# Patient Record
Sex: MALE | Race: WHITE | Employment: PART TIME
[De-identification: names, ages, dates, MRNs, and addresses within clinical notes are randomized per-mention and may not be internally consistent; named-entity substitution may affect disease eponyms.]

---

## 2024-01-23 ENCOUNTER — TRANSCRIBE ORDERS (OUTPATIENT)
Dept: OTHER | Age: 44
End: 2024-01-23

## 2024-01-23 DIAGNOSIS — G71.02 FACIOSCAPULOHUMERAL MUSCULAR DYSTROPHY (H): Primary | ICD-10-CM

## 2024-01-29 ENCOUNTER — TELEPHONE (OUTPATIENT)
Dept: NEUROLOGY | Facility: CLINIC | Age: 44
End: 2024-01-29
Payer: COMMERCIAL

## 2024-01-29 NOTE — TELEPHONE ENCOUNTER
Patient Contacted to schedule the following:    Appointment type: New Muscular Dystrophy  Provider: Cristel Foster Karachunski, [place]  Return date: First available  Specialty phone number: [phone]  Additional appointment(s) needed: N/A  Additional Notes: N/A    Spoke with patient, scheduled on 2/21 at 8:30am with Dr. Fam at [place].     Randell Sneed on 1/29/2024 at 3:42 PM

## 2024-02-20 NOTE — PROGRESS NOTES
CHIEF COMPLAINT / REASON FOR VISIT  FSHD    Referred by Dr. Herron    HISTORY OF PRESENT ILLNESS   is a 43 year old male presenting to neuromuscular clinic for evaluation of FSHD.  He was diagnosed and has been following up at [facility] in [place].  He is here today to establish care closer to home.    In brief summary, Mr. Miranda presented with facial weakness (inability to hold straw in place) since teenage years, then asymmetric (R>L) shoulder weakness in his 30s. EMG showed myopathic units with some fibrillation potentials. Genetic testing for FSHD was performed and confirmed repeat contraction in one allele (27kb (normal >38kb)) and 4qA haplotype.    He was last seen in hereditary muscle disease clinic at [facility] in March 2021.  In the past 3 years, he feels that The right arm has gradually gotten weaker.  He still trips on the right foot often as he is right ankle is weak.  He has not noticed much change in the left arm or left leg.  About a month ago, he woke up with pain and swelling in the bottom of the right foot (mainly in the heel).  The pain is worse when he bears weight and he has been walking on his toes on the right.  He was evaluated by podiatrist and has been using cam boot in the past 2 weeks.  He is taking naproxen twice a day, which helps alleviate some of the pain.  He feels that the pain has somewhat improved but very slowly.  He denies any shortness of breath or loud snore at night.  No trouble with swallowing.  He reports occasional neck and lower back pain that is mild and has not changed.    He recently found out that his paternal male cousin and the cousin's daughter were also diagnosed with FSHD.     Prior investigations:  - MRI right ankle 1/26/2024: Findings compatible with plantar fasciitis. There is subjacent thickening of the plantar fascia which   measures up to 8 mm in thickness with mild intrasubstance increased signal noted in the plantar fascia  posteriorly. No evidence of significant plantar fascial detachment from the calcaneus. Mild increased fluid content within the bursa posterior to the calcaneus and anterior to the distal Achilles tendon consistent with retrocalcaneal bursitis. The Achilles tendon appears intact. No evidence of an acute fracture, subluxation or dislocation. Joint spaces are preserved. Small/moderate right ankle joint effusion. Nonspecific subcutaneous edema about the right ankle.   - X-ray of cervical and lumbar spine from 2021 showed minimal cervical and lumbar degenerative changes.  - EKG in 2021 showed normal sinus rhythm.   - Overnight oximetry was indeterminate with evidence of possible sleep-related disordered breathing events. Of note, patient has been diagnosed with mild obstructive apnea before but has not been using CPAP.      REVIEW OF SYSTEMS  All negative except for what indicated in the HPI. The following portions of the patient's history were reviewed and updated as appropriate: allergies, current medications, family history, medical history, surgical history, social history, and problem list.     PAST MEDICAL/SURGICAL HISTORY   Psoriasis of skin. Denies joint pain.     FAMILY HISTORY  Father passed away from carbon monoxide poisoning. Mother is still alive and asymptomatic.  Two half brothers, 1 brother,  and one sister are asymptomatic.   Five children (4 daughters, 1 son, age 4 to 15 years) are asymptomatic.     MEDICATIONS    Current Outpatient Medications:     EPINEPHrine (EPIPEN 2-SANTOS) 0.3 MG/0.3ML injection 2-pack, Inject 0.3 mg into the muscle, Disp: , Rfl:     escitalopram (LEXAPRO) 20 MG tablet, Take 20 mg by mouth daily, Disp: , Rfl:     lisdexamfetamine (VYVANSE) 30 MG capsule, Take 30 mg by mouth every morning, Disp: , Rfl:     naproxen (NAPROSYN) 500 MG tablet, Take 500 mg by mouth daily, Disp: , Rfl:     ALLERGIES:  Allergies   Allergen Reactions    Bees Shortness Of Breath     Itching, scratchy throat     Cats Other (See Comments)     Bee stings         PHYSICAL EXAM  /86   Pulse 71   SpO2 97%     NEUROLOGICAL EXAMINATION  Mental status: normal.  Speech: normal.  Coordination: normal rapid alternating movements and finger to nose testing  Gait: antalgic gait as he cannot bear weight on the right heel  Posture: normal.  Romberg: negative.    The Neuropathy Impairment Scoring System (NIS) strength scale was utilized.    0=normal; -1=25% weak; -2=50% weak; -3=75% weak; -3.25=movement against gravity;   -3.5=movement, gravity eliminated;-3.75=minimal contraction; -4= paralysis.  Cranial nerves   R Muscle strength L  R  L   0 Frontalis 0   Visual acuity    0 Orbicularis oculi 0  3 mm Pupils 3 mm   -1 Lower facial muscles -1  0 Light reflex 0   0 Temporal-[place] 0  0 Ptosis 0   0 Palate-Pharynx 0  0 Extraocular muscles 0   0 Sternocleidomastoid 0       0 Trapezius 0   Hearing    0 Genioglossus 0              R Muscle, strength L  R Muscle, strength L    Neck     Trunk    0 Neck flexors 0   Abdominal muscles    0 Neck extensors 0   Paraspinals     Upper Limbs    Lower Limbs    0 Supraspinatus 0  -1 Iliopsoas 0   -1 Infraspinatus 0  0 Adductors, thigh 0   -2 Pectoralis -1  0 Gluteus medius 0   -1 Deltoid 0  0 Gluteus max 0   0 Biceps brachii 0  0 Quadriceps 0   0 Brachioradialis 0  0 Hamstrings 0    Supinator/pronator   -1 Tibialis anterior 0   -1 Triceps 0  0 Peronei 0   0 Wrist extensor 0  -2 EHL 0   0 Wrist flexors 0  0 Toe extensors 0   0 Digit extensors 0  0 Tibialis post. 0   0 Digit flexors 0  0 Toe flexors 0   0 Thenar 0  0 Calf muscles 0   0 Hypothenar 0       0 Interossei 0       Muscle atrophy / enlargement: right shoulder girdle atrophy with reverse axillary fold       R Reflexes L   -1 Biceps brachii -1   0 Brachioradialis 0   -1 Triceps -1   no Urena no   0 Quadriceps 0   * Gastroc-soleus 0   no Clonus (ankle) no   flexion Plantar response flexion   *deferred due to right ankle pain     High  arched palate: Absent. Elongated face: Absent. Scapular winging: mild lateral winging bilaterally (right worse than left) Pes cavus: Absent. Hammertoes: Absent. Contractures: Absent. Joint hypermobility: Absent.    Sensation  Face: normal.  Upper limbs: normal for all modalities.  Lower limbs: normal for all modalities.    ASSESSMENT / PLAN   #1 Fscioscapulohumeral muscular dystrophy (FSHD) type 1  #2 Right plantar fasciitis with right ankle effusion    Mr. Miranda's exam today continues to show asymmetric weakness in right>left shoulder girdle and mild weakness in right iliopsoas, TA, and EHL. These findings were relatively stable when compared to his exam from 2021. I suspect the right foot plantar fasciitis and right ankle effusion is related to the right ankle dorsiflexor weakness resulting in abnormal ankle and foot placement while walking. For this acute phase, I will refer him to orthopedic ankle/foot specialist to see if local injection will provide any benefit. He would also benefit from a referral to PT and OT once his ankle/foot pain is better controlled.     Recommendations:  - Consult orthopedic ankle/foot  - PT. He may benefit from right AFO. Will ask PT input on this.   - OT for shoulder girdle weakness  - He would like to see dermatology for further management of psoriasis  - Follow-up in 1 year    I spent a total of 60 minutes on the day of the visit for chart review, face-to-face visit, counseling/coordination of care, and documentation. Please see the note for further information on patient assessment and treatment.       PATIENT EDUCATION  Ready to learn, no apparent learning barriers were identified; learning preferences include listening.  Explained diagnosis and treatment plan; patient expressed understanding of the content.

## 2024-02-21 ENCOUNTER — OFFICE VISIT (OUTPATIENT)
Dept: NEUROLOGY | Facility: CLINIC | Age: 44
End: 2024-02-21
Attending: FAMILY MEDICINE
Payer: COMMERCIAL

## 2024-02-21 VITALS — DIASTOLIC BLOOD PRESSURE: 86 MMHG | SYSTOLIC BLOOD PRESSURE: 129 MMHG | HEART RATE: 71 BPM | OXYGEN SATURATION: 97 %

## 2024-02-21 DIAGNOSIS — L40.9 PSORIASIS: ICD-10-CM

## 2024-02-21 DIAGNOSIS — G71.02 FSHD (FACIOSCAPULOHUMERAL MUSCULAR DYSTROPHY) (H): Primary | ICD-10-CM

## 2024-02-21 DIAGNOSIS — M19.071 ARTHRITIS OF RIGHT ANKLE: ICD-10-CM

## 2024-02-21 PROCEDURE — 99205 OFFICE O/P NEW HI 60 MIN: CPT | Performed by: STUDENT IN AN ORGANIZED HEALTH CARE EDUCATION/TRAINING PROGRAM

## 2024-02-21 RX ORDER — ESCITALOPRAM OXALATE 20 MG/1
20 TABLET ORAL DAILY
COMMUNITY
Start: 2024-01-17 | End: 2025-01-16

## 2024-02-21 RX ORDER — LISDEXAMFETAMINE DIMESYLATE 30 MG/1
30 CAPSULE ORAL EVERY MORNING
COMMUNITY
Start: 2024-01-17

## 2024-02-21 RX ORDER — NAPROXEN 500 MG/1
500 TABLET ORAL DAILY
COMMUNITY
Start: 2024-01-17

## 2024-02-21 RX ORDER — EPINEPHRINE 0.3 MG/.3ML
0.3 INJECTION SUBCUTANEOUS
COMMUNITY
Start: 2023-07-06

## 2024-02-21 NOTE — PATIENT INSTRUCTIONS
- PT and OT eval after your right ankle inflammation has improved. You may benefit from AFO and shoulder sling  - Referral to orthopedic for right ankle arthritis  - Referral to dermatology for management of psoriasis  - Follow-up in 1 year

## 2024-02-28 ENCOUNTER — OFFICE VISIT (OUTPATIENT)
Dept: PODIATRY | Facility: OTHER | Age: 44
End: 2024-02-28
Attending: STUDENT IN AN ORGANIZED HEALTH CARE EDUCATION/TRAINING PROGRAM
Payer: COMMERCIAL

## 2024-02-28 VITALS
BODY MASS INDEX: 34.07 KG/M2 | DIASTOLIC BLOOD PRESSURE: 80 MMHG | SYSTOLIC BLOOD PRESSURE: 128 MMHG | HEIGHT: 75 IN | WEIGHT: 274 LBS

## 2024-02-28 DIAGNOSIS — S90.31XA CONTUSION OF FOOT OR HEEL, RIGHT, INITIAL ENCOUNTER: Primary | ICD-10-CM

## 2024-02-28 DIAGNOSIS — L40.9 PSORIASIS: ICD-10-CM

## 2024-02-28 DIAGNOSIS — G71.02 FSHD (FACIOSCAPULOHUMERAL MUSCULAR DYSTROPHY) (H): ICD-10-CM

## 2024-02-28 DIAGNOSIS — M19.071 ARTHRITIS OF RIGHT ANKLE: ICD-10-CM

## 2024-02-28 DIAGNOSIS — M72.2 PLANTAR FASCIITIS: ICD-10-CM

## 2024-02-28 PROCEDURE — 99203 OFFICE O/P NEW LOW 30 MIN: CPT | Performed by: PODIATRIST

## 2024-02-28 ASSESSMENT — PAIN SCALES - GENERAL: PAINLEVEL: MODERATE PAIN (5)

## 2024-02-28 NOTE — PROGRESS NOTES
HPI:  Rolan Miranda is a 43 year old male who is seen in consultation at the request of Neurology - Chelsey Fam MD    Pt presents for eval of:   (Onset, Location, L/R, Character, Treatments, Injury if yes)    MRI Right foot 1/26/2024 -  pushed to PACS from [facility]  XR Right foot 1/23/2024 - pushed to PACS from [facility]    Facioscapulohumeral muscular dystrophy     Onset Mid Jan 2024, stepping out of bed, unable to weight bear on Right foot. No injury noted. Presents with plantar and posterior Right heel pain that radiates posterior Right lower leg and lateral Right foot/ankle, WB w/tall black fx boot. Also drop foot - Right.  Constant dull ache 2-7/10. Sharp, stabbing, shooting pain with every step and certain ROM.    Obtained boot after MRI, wearing 24/7 for first week, now only wearing for daily activity. NWB for 5 weeks from onset.    Works [employer], .      ROS: 10 point ROS neg other than the symptoms noted above in the HPI.    Patient Active Problem List   Diagnosis    Facioscapulohumeral muscular dystrophy (H)       PAST MEDICAL HISTORY: History reviewed. No pertinent past medical history.  PAST SURGICAL HISTORY: History reviewed. No pertinent surgical history.  MEDICATIONS:   Current Outpatient Medications:     escitalopram (LEXAPRO) 20 MG tablet, Take 20 mg by mouth daily, Disp: , Rfl:     lisdexamfetamine (VYVANSE) 30 MG capsule, Take 30 mg by mouth every morning, Disp: , Rfl:     naproxen (NAPROSYN) 500 MG tablet, Take 500 mg by mouth daily, Disp: , Rfl:     EPINEPHrine (EPIPEN 2-SANTOS) 0.3 MG/0.3ML injection 2-pack, Inject 0.3 mg into the muscle (Patient not taking: Reported on 2/28/2024), Disp: , Rfl:   ALLERGIES:    Allergies   Allergen Reactions    Bees Shortness Of Breath     Itching, scratchy throat    Cats Other (See Comments)     Bee stings     SOCIAL HISTORY:   Social History     Socioeconomic History    Marital status:      Spouse name: Not on file    Number of  "children: Not on file    Years of education: Not on file    Highest education level: Not on file   Occupational History    Not on file   Tobacco Use    Smoking status: Former     Types: Cigarettes    Smokeless tobacco: Current    Tobacco comments:     vaping   Vaping Use    Vaping Use: Every day    Substances: Nicotine    Devices: Disposable   Substance and Sexual Activity    Alcohol use: Not on file    Drug use: Not on file    Sexual activity: Not on file   Other Topics Concern    Not on file   Social History Narrative    Not on file     Social Determinants of Health     Financial Resource Strain: Not on file   Food Insecurity: Not on file   Transportation Needs: Not on file   Physical Activity: Not on file   Stress: Not on file   Social Connections: Not on file   Interpersonal Safety: Not on file   Housing Stability: Not on file     FAMILY HISTORY: History reviewed. No pertinent family history.    EXAM:Vitals: /80 (BP Location: Left arm, Patient Position: Sitting, Cuff Size: Adult Large)   Ht 1.905 m (6' 3\")   Wt 124.3 kg (274 lb)   BMI 34.25 kg/m    BMI= Body mass index is 34.25 kg/m .    General appearance: Patient is alert and fully cooperative with history & exam.  No sign of distress is noted during the visit.     Psychiatric: Affect is pleasant & appropriate.  Patient appears motivated to improve health.     Respiratory: Breathing is regular & unlabored while sitting.     HEENT: Hearing is intact to spoken word.  Speech is clear.  No gross evidence of visual impairment that would impact ambulation.     Vascular: DP & PT 2/4 & regular bilaterally.  No significant edema, rubor or varicosities noted.  CFT and skin temperature is normal to both lower extremities.       Neurologic: Lower extremity sensation is intact to light touch.  No evidence of weakness in the lower extremities.  No evidence of neuropathy and negative tinel sign.     Dermatologic: Skin is intact to both lower extremities without " significant lesions, rash or abrasion.  Normal texture turgor and tone. No paronychia or evidence of soft tissue infection is noted.    Musculoskeletal: Patient is ambulatory without assistive device or brace.  Discomfort is noted with firm palpation along the medial band of the plantar fascia right foot most notably at the origination upon the calcaneus not through the arch.  No pain with compression of the calcaneus medial to lateral or with palpation of the peroneal or posterior tibial tendons.  There is discomfort about the posterior calcaneus at the insertion of the Achilles tendon on the right as well.  Today no visible edema is noted but there is subtle discomfort with compression of the calcaneus medial to lateral.    Radiographs:  Osteophyte noted about the plantar calcaneus consistent with plantar fasciitis.    MRI: 1/20/2024 demonstrates reactive edema about the posterior calcaneus along the distal insertion of the Achilles tendon and plantar fascia origination no displaced fracture or disrupted cortex noted.     ASSESSMENT:       ICD-10-CM    1. Contusion of foot or heel, right, initial encounter  S90.31XA Orthotics, Mastectomy and Custom Compression Orders      2. FSHD (facioscapulohumeral muscular dystrophy) (H)  G71.02 Orthopedic  Referral     Physical Therapy  Referral     Orthotics, Mastectomy and Custom Compression Orders      3. Psoriasis  L40.9 Orthopedic  Referral     Physical Therapy  Referral     Orthotics, Mastectomy and Custom Compression Orders      4. Arthritis of right ankle  M19.071 Orthopedic  Referral     Physical Therapy  Referral     Orthotics, Mastectomy and Custom Compression Orders      5. Plantar fasciitis  M72.2           PLAN:  Reviewed patient's chart in [facility] and discussed etiology and treatment options.      Treatments:  2/28/2024 February 1, 2024 started boot  Recommend being in the boot for 6-8 weeks secondary to his  history  Interpreted previous radiograph and MRI 1/24  Patient does have some reactive edema in the Achilles tendon and plantar fascia but mostly throughout the calcaneus some stress reaction throughout the calcaneus.  Patient does kick hose clamps with the back of his heel but otherwise no known injury.  He does have a dropfoot and does not wear a brace.  Recently seen a neurologist for his muscular dystrophy and there is question about whether he would benefit from an AFO.  Therefore I placed order for PT to evaluate for AFO.  In the meantime recommend he stays in the fracture boot even during night during sleep and rest to keep the ankle at 90 and can be weightbearing as tolerated in the fracture boot.  Placed order for custom molded orthotics for long-term.  If PT feels he would benefit most from AFO we can order the AFO instead on the right.  Letter for no work and follow-up with me again in 3 weeks and we will likely progress to work if he has orthotics.  We will request that he does not kick the hose clamps with the back of his heel as his normal standard of practice is.  Follow-up after PT  Letter to stay in boot for a total of 8 weeks        Guilluame Zayas DPM

## 2024-02-28 NOTE — LETTER
2/28/2024         RE: Rolan Miranda  [address] [place] [address]        Dear Colleague,    Thank you for referring your patient, Rolan Miranda, to the [facility]. Please see a copy of my visit note below.    HPI:  Rolan Miranda is a 43 year old male who is seen in consultation at the request of Neurology - Chelsey Fam MD    Pt presents for eval of:   (Onset, Location, L/R, Character, Treatments, Injury if yes)    MRI Right foot 1/26/2024 -  pushed to PACS from [facility]  XR Right foot 1/23/2024 - pushed to PACS from [facility]    Facioscapulohumeral muscular dystrophy     Onset Mid Jan 2024, stepping out of bed, unable to weight bear on Right foot. No injury noted. Presents with plantar and posterior Right heel pain that radiates posterior Right lower leg and lateral Right foot/ankle, WB w/tall black fx boot. Also drop foot - Right.  Constant dull ache 2-7/10. Sharp, stabbing, shooting pain with every step and certain ROM.    Obtained boot after MRI, wearing 24/7 for first week, now only wearing for daily activity. NWB for 5 weeks from onset.    Works [employer], .      ROS: 10 point ROS neg other than the symptoms noted above in the HPI.    Patient Active Problem List   Diagnosis     Facioscapulohumeral muscular dystrophy (H)       PAST MEDICAL HISTORY: History reviewed. No pertinent past medical history.  PAST SURGICAL HISTORY: History reviewed. No pertinent surgical history.  MEDICATIONS:   Current Outpatient Medications:      escitalopram (LEXAPRO) 20 MG tablet, Take 20 mg by mouth daily, Disp: , Rfl:      lisdexamfetamine (VYVANSE) 30 MG capsule, Take 30 mg by mouth every morning, Disp: , Rfl:      naproxen (NAPROSYN) 500 MG tablet, Take 500 mg by mouth daily, Disp: , Rfl:      EPINEPHrine (EPIPEN 2-SANTOS) 0.3 MG/0.3ML injection 2-pack, Inject 0.3 mg into the muscle (Patient not taking: Reported on 2/28/2024), Disp: , Rfl:   ALLERGIES:    Allergies  "  Allergen Reactions     Bees Shortness Of Breath     Itching, scratchy throat     Cats Other (See Comments)     Bee stings     SOCIAL HISTORY:   Social History     Socioeconomic History     Marital status:      Spouse name: Not on file     Number of children: Not on file     Years of education: Not on file     Highest education level: Not on file   Occupational History     Not on file   Tobacco Use     Smoking status: Former     Types: Cigarettes     Smokeless tobacco: Current     Tobacco comments:     vaping   Vaping Use     Vaping Use: Every day     Substances: Nicotine     Devices: Disposable   Substance and Sexual Activity     Alcohol use: Not on file     Drug use: Not on file     Sexual activity: Not on file   Other Topics Concern     Not on file   Social History Narrative     Not on file     Social Determinants of Health     Financial Resource Strain: Not on file   Food Insecurity: Not on file   Transportation Needs: Not on file   Physical Activity: Not on file   Stress: Not on file   Social Connections: Not on file   Interpersonal Safety: Not on file   Housing Stability: Not on file     FAMILY HISTORY: History reviewed. No pertinent family history.    EXAM:Vitals: /80 (BP Location: Left arm, Patient Position: Sitting, Cuff Size: Adult Large)   Ht 1.905 m (6' 3\")   Wt 124.3 kg (274 lb)   BMI 34.25 kg/m    BMI= Body mass index is 34.25 kg/m .    General appearance: Patient is alert and fully cooperative with history & exam.  No sign of distress is noted during the visit.     Psychiatric: Affect is pleasant & appropriate.  Patient appears motivated to improve health.     Respiratory: Breathing is regular & unlabored while sitting.     HEENT: Hearing is intact to spoken word.  Speech is clear.  No gross evidence of visual impairment that would impact ambulation.     Vascular: DP & PT 2/4 & regular bilaterally.  No significant edema, rubor or varicosities noted.  CFT and skin temperature is normal " to both lower extremities.       Neurologic: Lower extremity sensation is intact to light touch.  No evidence of weakness in the lower extremities.  No evidence of neuropathy and negative tinel sign.     Dermatologic: Skin is intact to both lower extremities without significant lesions, rash or abrasion.  Normal texture turgor and tone. No paronychia or evidence of soft tissue infection is noted.    Musculoskeletal: Patient is ambulatory without assistive device or brace.  Discomfort is noted with firm palpation along the medial band of the plantar fascia right foot most notably at the origination upon the calcaneus not through the arch.  No pain with compression of the calcaneus medial to lateral or with palpation of the peroneal or posterior tibial tendons.  There is discomfort about the posterior calcaneus at the insertion of the Achilles tendon on the right as well.  Today no visible edema is noted but there is subtle discomfort with compression of the calcaneus medial to lateral.    Radiographs:  Osteophyte noted about the plantar calcaneus consistent with plantar fasciitis.    MRI: 1/20/2024 demonstrates reactive edema about the posterior calcaneus along the distal insertion of the Achilles tendon and plantar fascia origination no displaced fracture or disrupted cortex noted.     ASSESSMENT:       ICD-10-CM    1. Contusion of foot or heel, right, initial encounter  S90.31XA Orthotics, Mastectomy and Custom Compression Orders      2. FSHD (facioscapulohumeral muscular dystrophy) (H)  G71.02 Orthopedic  Referral     Physical Therapy  Referral     Orthotics, Mastectomy and Custom Compression Orders      3. Psoriasis  L40.9 Orthopedic  Referral     Physical Therapy  Referral     Orthotics, Mastectomy and Custom Compression Orders      4. Arthritis of right ankle  M19.071 Orthopedic  Referral     Physical Therapy  Referral     Orthotics, Mastectomy and Custom  Compression Orders      5. Plantar fasciitis  M72.2           PLAN:  Reviewed patient's chart in [facility] and discussed etiology and treatment options.      Treatments:  2/28/2024 February 1, 2024 started boot  Recommend being in the boot for 6-8 weeks secondary to his history  Interpreted previous radiograph and MRI 1/24  Patient does have some reactive edema in the Achilles tendon and plantar fascia but mostly throughout the calcaneus some stress reaction throughout the calcaneus.  Patient does kick hose clamps with the back of his heel but otherwise no known injury.  He does have a dropfoot and does not wear a brace.  Recently seen a neurologist for his muscular dystrophy and there is question about whether he would benefit from an AFO.  Therefore I placed order for PT to evaluate for AFO.  In the meantime recommend he stays in the fracture boot even during night during sleep and rest to keep the ankle at 90 and can be weightbearing as tolerated in the fracture boot.  Placed order for custom molded orthotics for long-term.  If PT feels he would benefit most from AFO we can order the AFO instead on the right.  Letter for no work and follow-up with me again in 3 weeks and we will likely progress to work if he has orthotics.  We will request that he does not kick the hose clamps with the back of his heel as his normal standard of practice is.  Follow-up after PT  Letter to stay in boot for a total of 8 weeks        Guillaume Zayas DPM      Again, thank you for allowing me to participate in the care of your patient.        Sincerely,        Gulilaume Zayas DPM

## 2024-02-28 NOTE — LETTER
February 28, 2024      Rolan Miranda  [address]  [facility] [address]        To Whom It May Concern:    Rolan Miranda was seen in our clinic. He may not return to work at this time and must remain in the fracture boot and will follow up in one month.       Sincerely,      Guillaume Zayas

## 2024-03-08 ENCOUNTER — OFFICE VISIT (OUTPATIENT)
Dept: DERMATOLOGY | Facility: CLINIC | Age: 44
End: 2024-03-08
Attending: STUDENT IN AN ORGANIZED HEALTH CARE EDUCATION/TRAINING PROGRAM
Payer: COMMERCIAL

## 2024-03-08 VITALS — HEIGHT: 75 IN | WEIGHT: 256.1 LBS | BODY MASS INDEX: 31.84 KG/M2

## 2024-03-08 DIAGNOSIS — L40.9 PSORIASIS: ICD-10-CM

## 2024-03-08 DIAGNOSIS — L21.9 DERMATITIS, SEBORRHEIC: Primary | ICD-10-CM

## 2024-03-08 PROCEDURE — 99204 OFFICE O/P NEW MOD 45 MIN: CPT | Performed by: STUDENT IN AN ORGANIZED HEALTH CARE EDUCATION/TRAINING PROGRAM

## 2024-03-08 RX ORDER — KETOCONAZOLE 20 MG/G
CREAM TOPICAL
Qty: 60 G | Refills: 3 | Status: SHIPPED | OUTPATIENT
Start: 2024-03-08

## 2024-03-08 RX ORDER — HYDROCORTISONE 25 MG/G
OINTMENT TOPICAL
Qty: 60 G | Refills: 5 | Status: SHIPPED | OUTPATIENT
Start: 2024-03-08

## 2024-03-08 RX ORDER — CALCIPOTRIENE 50 UG/G
OINTMENT TOPICAL
Qty: 120 G | Refills: 4 | Status: SHIPPED | OUTPATIENT
Start: 2024-03-08

## 2024-03-08 RX ORDER — CLOBETASOL PROPIONATE 0.5 MG/G
OINTMENT TOPICAL 2 TIMES DAILY
Qty: 60 G | Refills: 4 | Status: SHIPPED | OUTPATIENT
Start: 2024-03-08

## 2024-03-08 ASSESSMENT — PAIN SCALES - GENERAL: PAINLEVEL: NO PAIN (0)

## 2024-03-08 NOTE — PATIENT INSTRUCTIONS
- For maintenance: apply clobetasol ointment to affected areas on hands and apply hydrocortisone ointment to genitals twice daily on Saturday and Sunday. Apply calcipotriene ointment to affected areas twice daily on Monday-Fridays.   - For flares of hands: Apply clobetasol and calcipotriene ointment in a 1:1 mixture twice daily to the affected areas for 3-4 weeks at a time. Apply to hands   - clobetasol should not be applied to face or skin folds. Potential side effects from prolonged topical steroid use such as atrophy, striae, and telangiectasias were reviewed.    For flares of genital area: Apply hydrocortisone and calcipotriene ointment in a 1:1 mixture twice daily to the affected areas for 3-4 weeks at a time.

## 2024-03-08 NOTE — PROGRESS NOTES
[facility] Dermatology Note  Encounter Date: Mar 8, 2024  Office Visit     Reviewed patients past medical history and pertinent chart review prior to patients visit today.     Dermatology Problem List:  Psoriasis   -Calcipotriene ointment, clobetasol ointment and hydrocortisone ointment  Previous treatments: Humira   2.  Seborrheic dermatitis, eyebrows  - Ketoconazole cream  ____________________________________________    Assessment & Plan:     # Plaque psoriasis  Condition and treatment options including topical steroid and nonsteroidal medications, phototherapy, systemic medications. Discussed that this is an autoimmune disease and will be an ongoing condition that does not have a cure but we have several treatments for management of the condition. Also discussed risk of associated psoriatic arthritis. Patient denies chronic joint pain or swelling that does not resolve throughout the day.  As patient has not been on topical therapy for quite some time, we will begin with topical therapy as outlined below:    - For maintenance: apply clobetasol ointment to affected areas on hands and apply hydrocortisone ointment to genitals twice daily on Saturday and Sunday. Apply calcipotriene ointment to affected areas twice daily on Monday-Fridays.   - For flares of hands: Apply clobetasol and calcipotriene ointment in a 1:1 mixture twice daily to the affected areas for 3-4 weeks at a time. Apply to hands   - clobetasol should not be applied to face or skin folds. Potential side effects from prolonged topical steroid use such as atrophy, striae, and telangiectasias were reviewed.    For flares of genital area: Apply hydrocortisone and calcipotriene ointment in a 1:1 mixture twice daily to the affected areas for 3-4 weeks at a time.    If the above is not adequately controlling the psoriasis we can discuss systemic therapy versus home light unit as his next appointment. We did also discuss the importance of  not using the tanning bed as this will increase his risk for skin cancer.  If he feels that the sunlight benefits his psoriasis, he could try for 10 to 15 minutes of unprotected sun exposure daily by going out doors.    #Seborrheic dermatitis, eyebrows  Patient to begin applying ketoconazole cream to affected area on eyebrows twice daily as needed for flares.    Follow up: 3 to 4-month    Estephania Paz PA-C  [facility]  Dermatology    _______________________________________    CC: No chief complaint on file.    HPI:  Mr. Rolan Miranda is a(n) 43 year old male who presents today as a new patient for Psoriasis. He found out he had psoriasis after being diagnosed with muscular dystrophy about 8-10 years ago. He has involvement of his hands, genitals, elbows, knees and eyelids. He started using steroid creams which helped to cleared up the psoriasis but then the creams stopped working. He was then started on Humira and after about 3-4 injections he had an injection site reaction and stopped taking the medication. Currently, he is not on any treatment for the psoriasis. He started tanning a few weeks ago and he feels that the psoriasis is getting better because of that. He started to jensen because he wanted to not be as pale. The psoriasis is pruritic for him. Denies joint pain.     Patient is otherwise feeling well, without additional skin concerns.    Physical Exam:  SKIN: Focused examination of bilateral elbows, bilateral lower extremities, genitalia, gluteal cleft and bilateral hands and face was performed.  - bilateral dorsal and palmar hands and scrotum, well-demarcated erythematous plaques with overlying silvery scale  -bilateral eyebrows, macular erythema with overlying adherent scale     - No other lesions of concern on areas examined.     Medications:  Current Outpatient Medications   Medication    EPINEPHrine (EPIPEN 2-SANTOS) 0.3 MG/0.3ML injection 2-pack    escitalopram (LEXAPRO) 20 MG tablet     lisdexamfetamine (VYVANSE) 30 MG capsule    naproxen (NAPROSYN) 500 MG tablet     No current facility-administered medications for this visit.      Past Medical History:   Patient Active Problem List   Diagnosis    Facioscapulohumeral muscular dystrophy (H)     No past medical history on file.    CC Chelsey Fam MD  [address] [place] [address] on close of this encounter.

## 2024-03-08 NOTE — LETTER
3/8/2024         RE: Rolan Miranda  [id number] [facility] [address]        Dear Colleague,    Thank you for referring your patient, Rolan Miranda, to the [facility]. Please see a copy of my visit note below.    [facility] Dermatology Note  Encounter Date: Mar 8, 2024  Office Visit     Reviewed patients past medical history and pertinent chart review prior to patients visit today.     Dermatology Problem List:  Psoriasis   -Calcipotriene ointment, clobetasol ointment and hydrocortisone ointment  Previous treatments: Humira   2.  Seborrheic dermatitis, eyebrows  - Ketoconazole cream  ____________________________________________    Assessment & Plan:     # Plaque psoriasis  Condition and treatment options including topical steroid and nonsteroidal medications, phototherapy, systemic medications. Discussed that this is an autoimmune disease and will be an ongoing condition that does not have a cure but we have several treatments for management of the condition. Also discussed risk of associated psoriatic arthritis. Patient denies chronic joint pain or swelling that does not resolve throughout the day.  As patient has not been on topical therapy for quite some time, we will begin with topical therapy as outlined below:    - For maintenance: apply clobetasol ointment to affected areas on hands and apply hydrocortisone ointment to genitals twice daily on Saturday and Sunday. Apply calcipotriene ointment to affected areas twice daily on Monday-Fridays.   - For flares of hands: Apply clobetasol and calcipotriene ointment in a 1:1 mixture twice daily to the affected areas for 3-4 weeks at a time. Apply to hands   - clobetasol should not be applied to face or skin folds. Potential side effects from prolonged topical steroid use such as atrophy, striae, and telangiectasias were reviewed.    For flares of genital area: Apply hydrocortisone and calcipotriene ointment in a 1:1  mixture twice daily to the affected areas for 3-4 weeks at a time.    If the above is not adequately controlling the psoriasis we can discuss systemic therapy versus home light unit as his next appointment. We did also discuss the importance of not using the tanning bed as this will increase his risk for skin cancer.  If he feels that the sunlight benefits his psoriasis, he could try for 10 to 15 minutes of unprotected sun exposure daily by going out doors.    #Seborrheic dermatitis, eyebrows  Patient to begin applying ketoconazole cream to affected area on eyebrows twice daily as needed for flares.    Follow up: 3 to 4-month    Estephania Paz PA-C  [facility]  Dermatology    _______________________________________    CC: No chief complaint on file.    HPI:  Mr. Rolan Miranda is a(n) 43 year old male who presents today as a new patient for Psoriasis. He found out he had psoriasis after being diagnosed with muscular dystrophy about 8-10 years ago. He has involvement of his hands, genitals, elbows, knees and eyelids. He started using steroid creams which helped to cleared up the psoriasis but then the creams stopped working. He was then started on Humira and after about 3-4 injections he had an injection site reaction and stopped taking the medication. Currently, he is not on any treatment for the psoriasis. He started tanning a few weeks ago and he feels that the psoriasis is getting better because of that. He started to jensen because he wanted to not be as pale. The psoriasis is pruritic for him. Denies joint pain.     Patient is otherwise feeling well, without additional skin concerns.    Physical Exam:  SKIN: Focused examination of bilateral elbows, bilateral lower extremities, genitalia, gluteal cleft and bilateral hands and face was performed.  - bilateral dorsal and palmar hands and scrotum, well-demarcated erythematous plaques with overlying silvery scale  -bilateral eyebrows, macular erythema with  overlying adherent scale     - No other lesions of concern on areas examined.     Medications:  Current Outpatient Medications   Medication     EPINEPHrine (EPIPEN 2-SANTOS) 0.3 MG/0.3ML injection 2-pack     escitalopram (LEXAPRO) 20 MG tablet     lisdexamfetamine (VYVANSE) 30 MG capsule     naproxen (NAPROSYN) 500 MG tablet     No current facility-administered medications for this visit.      Past Medical History:   Patient Active Problem List   Diagnosis     Facioscapulohumeral muscular dystrophy (H)     No past medical history on file.    CC Chelsey Fam MD  [address] [place] [id number] on close of this encounter.        Again, thank you for allowing me to participate in the care of your patient.        Sincerely,        Estephania Paz PA-C

## 2024-03-10 ENCOUNTER — HEALTH MAINTENANCE LETTER (OUTPATIENT)
Age: 44
End: 2024-03-10

## 2024-03-20 ENCOUNTER — OFFICE VISIT (OUTPATIENT)
Dept: PODIATRY | Facility: OTHER | Age: 44
End: 2024-03-20
Payer: COMMERCIAL

## 2024-03-20 VITALS
SYSTOLIC BLOOD PRESSURE: 120 MMHG | WEIGHT: 256.1 LBS | DIASTOLIC BLOOD PRESSURE: 80 MMHG | HEIGHT: 75 IN | BODY MASS INDEX: 31.84 KG/M2

## 2024-03-20 DIAGNOSIS — M72.2 PLANTAR FASCIITIS: ICD-10-CM

## 2024-03-20 DIAGNOSIS — M76.61 ACHILLES TENDINITIS, RIGHT LEG: ICD-10-CM

## 2024-03-20 DIAGNOSIS — M19.071 ARTHRITIS OF RIGHT ANKLE: ICD-10-CM

## 2024-03-20 DIAGNOSIS — G71.02 FSHD (FACIOSCAPULOHUMERAL MUSCULAR DYSTROPHY) (H): ICD-10-CM

## 2024-03-20 DIAGNOSIS — S90.31XA CONTUSION OF FOOT OR HEEL, RIGHT, INITIAL ENCOUNTER: Primary | ICD-10-CM

## 2024-03-20 PROCEDURE — 99213 OFFICE O/P EST LOW 20 MIN: CPT | Performed by: PODIATRIST

## 2024-03-20 ASSESSMENT — PAIN SCALES - GENERAL: PAINLEVEL: MODERATE PAIN (5)

## 2024-03-20 NOTE — LETTER
March 20, 2024      Rolan Miranda  [address] [facility] [id number]        To Whom It May Concern:    Rolan Miranda was seen in our clinic. He may not return to work at this time and must remain in the fracture boot and will follow up in 3 weeks.         Sincerely,        Guillaume Zayas DPM

## 2024-03-20 NOTE — PROGRESS NOTES
Chief Complaint   Patient presents with    RECHECK     PT eval 3/21/2024, WB w/tall black fx boot, reports posterior Right heel pain @ 3/20/24 OV, Contusion Right calcaneus, Right plantar fasciitis and achilles tendinitis, onset mid Jan 2024; LOV 2/28/2024     HPI:  Rolan Miranda is a 43 year old male who is seen in consultation at the request of Neurology - Chelsey Fam MD    Pt presents for eval of:   (Onset, Location, L/R, Character, Treatments, Injury if yes)    MRI Right foot 1/26/2024 -  pushed to PACS from [facility]  XR Right foot 1/23/2024 - pushed to PACS from [facility]    Facioscapulohumeral muscular dystrophy     Onset Mid Jan 2024, stepping out of bed, unable to weight bear on Right foot. No injury noted. Presents with plantar and posterior Right heel pain that radiates posterior Right lower leg and lateral Right foot/ankle, WB w/tall black fx boot. Also drop foot - Right.  Constant dull ache 2-7/10. Sharp, stabbing, shooting pain with every step and certain ROM.    Obtained boot after MRI, wearing 24/7 for first week, now only wearing for daily activity. NWB for 5 weeks from onset.    Having trouble making boot comfortable now and it seems too large and causing irritations and not keeping ankle still and also not getting better because boot moves.     Works [employer], .    ROS: 10 point ROS neg other than the symptoms noted above in the HPI.    Patient Active Problem List   Diagnosis    Facioscapulohumeral muscular dystrophy (H)     PAST MEDICAL HISTORY: History reviewed. No pertinent past medical history.  PAST SURGICAL HISTORY: History reviewed. No pertinent surgical history.  MEDICATIONS:   Current Outpatient Medications:     calcipotriene (DOVONOX) 0.005 % external ointment, For flares apply in a one-to-one mixture with clobetasol ointment to affected areas on hands and apply in 1:1 mixture with hydrocortisone ointment to genitals twice daily for 3 to 4 weeks.  For  maintenance, apply to affected area twice daily on Monday - Fridays, Disp: 120 g, Rfl: 4    clobetasol (TEMOVATE) 0.05 % external ointment, Apply topically 2 times daily For flares apply in a one-to-one mixture with calcipotriene ointment to affected areas on hands twice daily for 3 to 4 weeks.  For maintenance, apply to affected area twice daily on Saturdays and Sundays, Disp: 60 g, Rfl: 4    escitalopram (LEXAPRO) 20 MG tablet, Take 20 mg by mouth daily, Disp: , Rfl:     hydrocortisone 2.5 % ointment, For flares apply in a one-to-one mixture with calcipotriene ointment to affected areas on genitals twice daily for 3 to 4 weeks.  For maintenance, apply to affected area twice daily on Saturdays and Sundays, Disp: 60 g, Rfl: 5    ketoconazole (NIZORAL) 2 % external cream, Apply twice daily to eyebrows as needed, Disp: 60 g, Rfl: 3    lisdexamfetamine (VYVANSE) 30 MG capsule, Take 30 mg by mouth every morning, Disp: , Rfl:     naproxen (NAPROSYN) 500 MG tablet, Take 500 mg by mouth daily, Disp: , Rfl:     EPINEPHrine (EPIPEN 2-SANTOS) 0.3 MG/0.3ML injection 2-pack, Inject 0.3 mg into the muscle (Patient not taking: Reported on 2/28/2024), Disp: , Rfl:   ALLERGIES:    Allergies   Allergen Reactions    Bees Shortness Of Breath     Itching, scratchy throat    Cats Other (See Comments)     Bee stings     SOCIAL HISTORY:   Social History     Socioeconomic History    Marital status:      Spouse name: Not on file    Number of children: Not on file    Years of education: Not on file    Highest education level: Not on file   Occupational History    Not on file   Tobacco Use    Smoking status: Former     Types: Cigarettes    Smokeless tobacco: Current    Tobacco comments:     vaping   Vaping Use    Vaping Use: Every day    Substances: Nicotine    Devices: Disposable   Substance and Sexual Activity    Alcohol use: Not on file    Drug use: Not on file    Sexual activity: Not on file   Other Topics Concern    Not on file  "  Social History Narrative    Not on file     Social Determinants of Health     Financial Resource Strain: Not on file   Food Insecurity: Not on file   Transportation Needs: Not on file   Physical Activity: Not on file   Stress: Not on file   Social Connections: Not on file   Interpersonal Safety: Not on file   Housing Stability: Not on file     FAMILY HISTORY: History reviewed. No pertinent family history.    EXAM:Vitals: /80 (BP Location: Right arm, Patient Position: Sitting, Cuff Size: Adult Regular)   Ht 1.905 m (6' 3\")   Wt 116.2 kg (256 lb 1.6 oz)   BMI 32.01 kg/m    BMI= Body mass index is 32.01 kg/m .    General appearance: Patient is alert and fully cooperative with history & exam.  No sign of distress is noted during the visit.     Psychiatric: Affect is pleasant & appropriate.  Patient appears motivated to improve health.     Respiratory: Breathing is regular & unlabored while sitting.     HEENT: Hearing is intact to spoken word.  Speech is clear.  No gross evidence of visual impairment that would impact ambulation.     Vascular: DP & PT 2/4 & regular bilaterally.  No significant edema, rubor or varicosities noted.  CFT and skin temperature is normal to both lower extremities.       Neurologic: Lower extremity sensation is intact to light touch.  No evidence of weakness in the lower extremities.  No evidence of neuropathy and negative tinel sign.     Dermatologic: Skin is intact to both lower extremities without significant lesions, rash or abrasion.  Normal texture turgor and tone. No paronychia or evidence of soft tissue infection is noted.    Musculoskeletal: Patient is ambulatory without assistive device or brace.  Discomfort is noted with firm palpation along the medial band of the plantar fascia right foot most notably at the origination upon the calcaneus not through the arch.  No pain with compression of the calcaneus medial to lateral or with palpation of the peroneal or posterior tibial " tendons.  There is discomfort about the posterior calcaneus at the insertion of the Achilles tendon on the right as well.  Today no visible edema is noted but there is subtle discomfort with compression of the calcaneus medial to lateral.    Radiographs:  Osteophyte noted about the plantar calcaneus consistent with plantar fasciitis.    MRI: 1/20/2024 demonstrates reactive edema about the posterior calcaneus along the distal insertion of the Achilles tendon and plantar fascia origination no displaced fracture or disrupted cortex noted.     ASSESSMENT:       ICD-10-CM    1. Contusion of foot or heel, right, initial encounter  S90.31XA       2. Plantar fasciitis  M72.2       3. Achilles tendinitis, right leg  M76.61       4. FSHD (facioscapulohumeral muscular dystrophy) (H)  G71.02       5. Arthritis of right ankle  M19.071           PLAN:  [facility] and discussed etiology and treatment options.      Treatments:  2/28/2024 February 1, 2024 started boot  Recommend being in the boot for 6-8 weeks secondary to his history  Interpreted previous radiograph and MRI 1/24  Patient does have some reactive edema in the Achilles tendon and plantar fascia but mostly throughout the calcaneus some stress reaction throughout the calcaneus.  Patient does kick hose clamps with the back of his heel but otherwise no known injury.  He does have a dropfoot and does not wear a brace.  Recently seen a neurologist for his muscular dystrophy and there is question about whether he would benefit from an AFO.  Therefore I placed order for PT to evaluate for AFO.  In the meantime recommend he stays in the fracture boot even during night during sleep and rest to keep the ankle at 90 and can be weightbearing as tolerated in the fracture boot.  Placed order for custom molded orthotics for long-term.  If PT feels he would benefit most from AFO we can order the AFO instead on the right.  Letter for no work and follow-up with me  again in 3 weeks and we will likely progress to work if he has orthotics.  We will request that he does not kick the hose clamps with the back of his heel as his normal standard of practice is.  Follow-up after PT  Letter to stay in boot for a total of 8 weeks    3/20/2024  Last visit with neurology 2/24  PT cancelled PT visit and that resched tomorrow.   Not feeling a lot better yet.  Feels the boot is flopping around a lot and the air bladder has broken  Has appt with orthotics too but not sure when is there scheduling system is not in [facility] its [facility]  Letter for no work still as PT not started and not feeling a lot better.    Therefore I dispensed a new fracture boot today  We will work with PT to determine if he would benefit from an AFO or simply the orthotic but I suspect the orthotic will likely be most functional for him due to his range of motion and overall function  Letter was dispensed to continue to work and follow-up again in 3 weeks.  Stay in the fracture boot for all weightbearing activity short periods of weightbearing only, and keep the fracture boot in place during rest and sleep as well.      Guillaume Zayas DPM

## 2024-03-20 NOTE — NURSING NOTE
Patient was dispensed a tall black fracture boot from an outside facility. It is not fitting him well. He is going to check with his insurance, if they approve to pay for another, the following would be his size:  Large tall gray fx boot.  Radha Webb [facility], March 20, 2024

## 2024-03-20 NOTE — LETTER
3/20/2024         RE: Rolan Miranda  [address] [place] [address]        Dear Colleague,    Thank you for referring your patient, Rolan Miranda, to the [facility]. Please see a copy of my visit note below.    Chief Complaint   Patient presents with     RECHECK     PT eval 3/21/2024, WB w/tall black fx boot, reports posterior Right heel pain @ 3/20/24 OV, Contusion Right calcaneus, Right plantar fasciitis and achilles tendinitis, onset mid Jan 2024; LOV 2/28/2024     HPI:  Rolan Miranda is a 43 year old male who is seen in consultation at the request of Neurology - Chelsey Fam MD    Pt presents for eval of:   (Onset, Location, L/R, Character, Treatments, Injury if yes)    MRI Right foot 1/26/2024 -  pushed to PACS from [facility]  XR Right foot 1/23/2024 - pushed to PACS from [facility]    Facioscapulohumeral muscular dystrophy     Onset Mid Jan 2024, stepping out of bed, unable to weight bear on Right foot. No injury noted. Presents with plantar and posterior Right heel pain that radiates posterior Right lower leg and lateral Right foot/ankle, WB w/tall black fx boot. Also drop foot - Right.  Constant dull ache 2-7/10. Sharp, stabbing, shooting pain with every step and certain ROM.    Obtained boot after MRI, wearing 24/7 for first week, now only wearing for daily activity. NWB for 5 weeks from onset.    Having trouble making boot comfortable now and it seems too large and causing irritations and not keeping ankle still and also not getting better because boot moves.     Works [employer], .    ROS: 10 point ROS neg other than the symptoms noted above in the HPI.    Patient Active Problem List   Diagnosis     Facioscapulohumeral muscular dystrophy (H)     PAST MEDICAL HISTORY: History reviewed. No pertinent past medical history.  PAST SURGICAL HISTORY: History reviewed. No pertinent surgical history.  MEDICATIONS:   Current Outpatient Medications:       calcipotriene (DOVONOX) 0.005 % external ointment, For flares apply in a one-to-one mixture with clobetasol ointment to affected areas on hands and apply in 1:1 mixture with hydrocortisone ointment to genitals twice daily for 3 to 4 weeks.  For maintenance, apply to affected area twice daily on Monday - Fridays, Disp: 120 g, Rfl: 4     clobetasol (TEMOVATE) 0.05 % external ointment, Apply topically 2 times daily For flares apply in a one-to-one mixture with calcipotriene ointment to affected areas on hands twice daily for 3 to 4 weeks.  For maintenance, apply to affected area twice daily on Saturdays and Sundays, Disp: 60 g, Rfl: 4     escitalopram (LEXAPRO) 20 MG tablet, Take 20 mg by mouth daily, Disp: , Rfl:      hydrocortisone 2.5 % ointment, For flares apply in a one-to-one mixture with calcipotriene ointment to affected areas on genitals twice daily for 3 to 4 weeks.  For maintenance, apply to affected area twice daily on Saturdays and Sundays, Disp: 60 g, Rfl: 5     ketoconazole (NIZORAL) 2 % external cream, Apply twice daily to eyebrows as needed, Disp: 60 g, Rfl: 3     lisdexamfetamine (VYVANSE) 30 MG capsule, Take 30 mg by mouth every morning, Disp: , Rfl:      naproxen (NAPROSYN) 500 MG tablet, Take 500 mg by mouth daily, Disp: , Rfl:      EPINEPHrine (EPIPEN 2-SANTOS) 0.3 MG/0.3ML injection 2-pack, Inject 0.3 mg into the muscle (Patient not taking: Reported on 2/28/2024), Disp: , Rfl:   ALLERGIES:    Allergies   Allergen Reactions     Bees Shortness Of Breath     Itching, scratchy throat     Cats Other (See Comments)     Bee stings     SOCIAL HISTORY:   Social History     Socioeconomic History     Marital status:      Spouse name: Not on file     Number of children: Not on file     Years of education: Not on file     Highest education level: Not on file   Occupational History     Not on file   Tobacco Use     Smoking status: Former     Types: Cigarettes     Smokeless tobacco: Current     Tobacco  "comments:     vaping   Vaping Use     Vaping Use: Every day     Substances: Nicotine     Devices: Disposable   Substance and Sexual Activity     Alcohol use: Not on file     Drug use: Not on file     Sexual activity: Not on file   Other Topics Concern     Not on file   Social History Narrative     Not on file     Social Determinants of Health     Financial Resource Strain: Not on file   Food Insecurity: Not on file   Transportation Needs: Not on file   Physical Activity: Not on file   Stress: Not on file   Social Connections: Not on file   Interpersonal Safety: Not on file   Housing Stability: Not on file     FAMILY HISTORY: History reviewed. No pertinent family history.    EXAM:Vitals: /80 (BP Location: Right arm, Patient Position: Sitting, Cuff Size: Adult Regular)   Ht 1.905 m (6' 3\")   Wt 116.2 kg (256 lb 1.6 oz)   BMI 32.01 kg/m    BMI= Body mass index is 32.01 kg/m .    General appearance: Patient is alert and fully cooperative with history & exam.  No sign of distress is noted during the visit.     Psychiatric: Affect is pleasant & appropriate.  Patient appears motivated to improve health.     Respiratory: Breathing is regular & unlabored while sitting.     HEENT: Hearing is intact to spoken word.  Speech is clear.  No gross evidence of visual impairment that would impact ambulation.     Vascular: DP & PT 2/4 & regular bilaterally.  No significant edema, rubor or varicosities noted.  CFT and skin temperature is normal to both lower extremities.       Neurologic: Lower extremity sensation is intact to light touch.  No evidence of weakness in the lower extremities.  No evidence of neuropathy and negative tinel sign.     Dermatologic: Skin is intact to both lower extremities without significant lesions, rash or abrasion.  Normal texture turgor and tone. No paronychia or evidence of soft tissue infection is noted.    Musculoskeletal: Patient is ambulatory without assistive device or brace.  Discomfort is " noted with firm palpation along the medial band of the plantar fascia right foot most notably at the origination upon the calcaneus not through the arch.  No pain with compression of the calcaneus medial to lateral or with palpation of the peroneal or posterior tibial tendons.  There is discomfort about the posterior calcaneus at the insertion of the Achilles tendon on the right as well.  Today no visible edema is noted but there is subtle discomfort with compression of the calcaneus medial to lateral.    Radiographs:  Osteophyte noted about the plantar calcaneus consistent with plantar fasciitis.    MRI: 1/20/2024 demonstrates reactive edema about the posterior calcaneus along the distal insertion of the Achilles tendon and plantar fascia origination no displaced fracture or disrupted cortex noted.     ASSESSMENT:       ICD-10-CM    1. Contusion of foot or heel, right, initial encounter  S90.31XA       2. Plantar fasciitis  M72.2       3. Achilles tendinitis, right leg  M76.61       4. FSHD (facioscapulohumeral muscular dystrophy) (H)  G71.02       5. Arthritis of right ankle  M19.071           PLAN:  Reviewed patient's chart in [facility] and discussed etiology and treatment options.      Treatments:  2/28/2024 February 1, 2024 started boot  Recommend being in the boot for 6-8 weeks secondary to his history  Interpreted previous radiograph and MRI 1/24  Patient does have some reactive edema in the Achilles tendon and plantar fascia but mostly throughout the calcaneus some stress reaction throughout the calcaneus.  Patient does kick hose clamps with the back of his heel but otherwise no known injury.  He does have a dropfoot and does not wear a brace.  Recently seen a neurologist for his muscular dystrophy and there is question about whether he would benefit from an AFO.  Therefore I placed order for PT to evaluate for AFO.  In the meantime recommend he stays in the fracture boot even during night during sleep and rest  to keep the ankle at 90 and can be weightbearing as tolerated in the fracture boot.  Placed order for custom molded orthotics for long-term.  If PT feels he would benefit most from AFO we can order the AFO instead on the right.  Letter for no work and follow-up with me again in 3 weeks and we will likely progress to work if he has orthotics.  We will request that he does not kick the hose clamps with the back of his heel as his normal standard of practice is.  Follow-up after PT  Letter to stay in boot for a total of 8 weeks    3/20/2024  Last visit with neurology 2/24  PT cancelled PT visit and that resched tomorrow.   Not feeling a lot better yet.  Feels the boot is flopping around a lot and the air bladder has broken  Has appt with orthotics too but not sure when is there scheduling system is not in epic its [facility]  Letter for no work still as PT not started and not feeling a lot better.    Therefore I dispensed a new fracture boot today  We will work with PT to determine if he would benefit from an AFO or simply the orthotic but I suspect the orthotic will likely be most functional for him due to his range of motion and overall function  Letter was dispensed to continue to work and follow-up again in 3 weeks.  Stay in the fracture boot for all weightbearing activity short periods of weightbearing only, and keep the fracture boot in place during rest and sleep as well.      Guillaume Zayas DPM          Again, thank you for allowing me to participate in the care of your patient.        Sincerely,        Guillaume Zayas DPM

## 2024-07-03 ENCOUNTER — TELEPHONE (OUTPATIENT)
Dept: NEUROLOGY | Facility: CLINIC | Age: 44
End: 2024-07-03
Payer: COMMERCIAL

## 2025-03-16 ENCOUNTER — HEALTH MAINTENANCE LETTER (OUTPATIENT)
Age: 45
End: 2025-03-16

## 2025-05-02 ENCOUNTER — OFFICE VISIT (OUTPATIENT)
Dept: NEUROLOGY | Facility: CLINIC | Age: 45
End: 2025-05-02
Payer: COMMERCIAL

## 2025-05-02 VITALS
HEIGHT: 75 IN | HEART RATE: 71 BPM | SYSTOLIC BLOOD PRESSURE: 312 MMHG | DIASTOLIC BLOOD PRESSURE: 84 MMHG | WEIGHT: 266.5 LBS | OXYGEN SATURATION: 97 % | BODY MASS INDEX: 33.14 KG/M2

## 2025-05-02 DIAGNOSIS — G71.02 FSHD (FACIOSCAPULOHUMERAL MUSCULAR DYSTROPHY) (H): Primary | ICD-10-CM

## 2025-05-02 PROCEDURE — 3077F SYST BP >= 140 MM HG: CPT | Performed by: STUDENT IN AN ORGANIZED HEALTH CARE EDUCATION/TRAINING PROGRAM

## 2025-05-02 PROCEDURE — 99214 OFFICE O/P EST MOD 30 MIN: CPT | Performed by: STUDENT IN AN ORGANIZED HEALTH CARE EDUCATION/TRAINING PROGRAM

## 2025-05-02 PROCEDURE — 3079F DIAST BP 80-89 MM HG: CPT | Performed by: STUDENT IN AN ORGANIZED HEALTH CARE EDUCATION/TRAINING PROGRAM

## 2025-05-02 PROCEDURE — 1125F AMNT PAIN NOTED PAIN PRSNT: CPT | Performed by: STUDENT IN AN ORGANIZED HEALTH CARE EDUCATION/TRAINING PROGRAM

## 2025-05-02 ASSESSMENT — PAIN SCALES - GENERAL: PAINLEVEL_OUTOF10: MODERATE PAIN (4)

## 2025-05-02 NOTE — NURSING NOTE
Chief Complaint   Patient presents with    RECHECK     Neurology      Vitals were taken and medications were reconciled.   Apolinar Juarez, EMT  3:28 PM

## 2025-05-02 NOTE — PROGRESS NOTES
CHIEF COMPLAINT / REASON FOR VISIT  F/U for FSHD    HISTORY OF PRESENT ILLNESS  In brief summary, Mr. Miranda presented with facial weakness (inability to hold straw in place) since teenage years, then asymmetric (R>L) shoulder weakness in his 30s. EMG showed myopathic units with some fibrillation potentials. Genetic testing for FSHD was performed and confirmed repeat contraction in one allele (27kb (normal >38kb)) and 4qA haplotype.  His paternal male cousin and the cousin's daughter were also diagnosed with FSHD.     Prior investigations:  - MRI right ankle 1/26/2024: Findings compatible with plantar fasciitis. There is subjacent thickening of the plantar fascia which   measures up to 8 mm in thickness with mild intrasubstance increased signal noted in the plantar fascia posteriorly. No evidence of significant plantar fascial detachment from the calcaneus. Mild increased fluid content within the bursa posterior to the calcaneus and anterior to the distal Achilles tendon consistent with retrocalcaneal bursitis. The Achilles tendon appears intact. No evidence of an acute fracture, subluxation or dislocation. Joint spaces are preserved. Small/moderate right ankle joint effusion. Nonspecific subcutaneous edema about the right ankle.   - X-ray of cervical and lumbar spine from 2021 showed minimal cervical and lumbar degenerative changes.  - EKG in 2021 showed normal sinus rhythm.   - Overnight oximetry was indeterminate with evidence of possible sleep-related disordered breathing events. Of note, patient has been diagnosed with mild obstructive apnea before but has not been using CPAP.      INTERVAL HISTORY  He was last seen in this clinic in February 2024. He has noticed slight worsening in right proximal arm strength. Lifting things up high are more difficult. He gets frequent aching pain in the right arm and shoulder if he uses it too much. Right foot and ankle pain/weakness have significantly improved after rehab  and wearing camboot for several months.  He has not noticed much change in the left arm or left leg.  He denies any shortness of breath or loud snore at night. He did not tolerate CPAP in the past and is not ready to try it again. No trouble with swallowing.      He was on Wegovy and lost 40 lbs over 4-5 months. However, he developed GI side effects (stomach cramp, diarrhea) and has stopped the medication 3 weeks ago.     REVIEW OF SYSTEMS  All negative except for what indicated in the HPI. The following portions of the patient's history were reviewed and updated as appropriate: allergies, current medications, family history, medical history, surgical history, social history, and problem list.     PAST MEDICAL/SURGICAL HISTORY   Psoriasis of skin. Denies joint pain.     FAMILY HISTORY  Father passed away from carbon monoxide poisoning. Mother is still alive and asymptomatic.  Two half brothers, 1 brother,  and one sister are asymptomatic.   Five children (4 daughters, 1 son, age 4 to 15 years) are asymptomatic.     PHYSICAL EXAM    NEUROLOGICAL EXAMINATION  Mental status: normal.  Speech: normal.  Coordination: normal rapid alternating movements and finger to nose testing  Gait: antalgic gait as he cannot bear weight on the right heel  Posture: normal.  Romberg: negative.    The Neuropathy Impairment Scoring System (NIS) strength scale was utilized.    0=normal; -1=25% weak; -2=50% weak; -3=75% weak; -3.25=movement against gravity;   -3.5=movement, gravity eliminated;-3.75=minimal contraction; -4= paralysis.  Cranial nerves   R Muscle strength L  R  L   0 Frontalis 0   Visual acuity    0 Orbicularis oculi 0  3 mm Pupils 3 mm   -1 Lower facial muscles -1  0 Light reflex 0   0 Temporal-Masseter 0  0 Ptosis 0   0 Palate-Pharynx 0  0 Extraocular muscles 0   0 Sternocleidomastoid 0       0 Trapezius 0   Hearing    0 Genioglossus 0              R Muscle, strength L  R Muscle, strength L    Neck     Trunk    0 Neck flexors  0   Abdominal muscles    0 Neck extensors 0   Paraspinals     Upper Limbs    Lower Limbs    0 Supraspinatus 0  0 Iliopsoas 0   -1 Infraspinatus 0  0 Adductors, thigh 0   -2 Pectoralis -1  0 Gluteus medius 0   0 Deltoid 0  0 Gluteus max 0   0 Biceps brachii 0  0 Quadriceps 0   0 Brachioradialis 0  0 Hamstrings 0    Supinator/pronator   0 Tibialis anterior 0   -1 Triceps 0  0 Peronei 0   0 Wrist extensor 0  0 EHL 0   0 Wrist flexors 0  0 Toe extensors 0   0 Digit extensors 0  0 Tibialis post. 0   0 Digit flexors 0  0 Toe flexors 0   0 Thenar 0  0 Calf muscles 0   0 Hypothenar 0       0 Interossei 0       Muscle atrophy / enlargement: right shoulder girdle atrophy with reverse axillary fold       R Reflexes L   -1 Biceps brachii -1   0 Brachioradialis 0   -1 Triceps -1   no Urena no   0 Quadriceps 0   * Gastroc-soleus 0   no Clonus (ankle) no   flexion Plantar response flexion   *deferred due to right ankle pain     High arched palate: Absent. Elongated face: Absent. Scapular winging: mild lateral winging bilaterally (right worse than left) Pes cavus: Absent. Hammertoes: Absent. Contractures: Absent. Joint hypermobility: Absent.    Sensation  Face: normal.  Upper limbs: normal for all modalities.  Lower limbs: normal for all modalities.    ASSESSMENT / PLAN   #1 Fscioscapulohumeral muscular dystrophy (FSHD) type 1  #2 Right plantar fasciitis with right ankle effusion --> now improved     Mr. Miranda's exam today is overall stable from last year and continues to show asymmetric weakness in right>left shoulder girdle. Right leg weakness has improved as his orthopedic ankle and foot issues have resolved. We discussed trying different type of NIV/CPAP at night but he is not ready for this. I recommend continuing active lifestyle, regular stretching and ROM exercise to help with shoulder pain.     - Follow-up in 1 year    I spent a total of 30 minutes on the day of the visit for chart review, face-to-face visit,  counseling/coordination of care, and documentation. Please see the note for further information on patient assessment and treatment.       PATIENT EDUCATION  Ready to learn, no apparent learning barriers were identified; learning preferences include listening.  Explained diagnosis and treatment plan; patient expressed understanding of the content.

## 2025-05-02 NOTE — LETTER
5/2/2025       RE: Rolan Miranda  [address] [facility] [address]     Dear Colleague,    Thank you for referring your patient, Rolan Miranda, to the [facility] [place] at [facility]. Please see a copy of my visit note below.    CHIEF COMPLAINT / REASON FOR VISIT  F/U for FSHD    HISTORY OF PRESENT ILLNESS  In brief summary, Mr. Miranda presented with facial weakness (inability to hold straw in place) since teenage years, then asymmetric (R>L) shoulder weakness in his 30s. EMG showed myopathic units with some fibrillation potentials. Genetic testing for FSHD was performed and confirmed repeat contraction in one allele (27kb (normal >38kb)) and 4qA haplotype.  His paternal male cousin and the cousin's daughter were also diagnosed with FSHD.     Prior investigations:  - MRI right ankle 1/26/2024: Findings compatible with plantar fasciitis. There is subjacent thickening of the plantar fascia which   measures up to 8 mm in thickness with mild intrasubstance increased signal noted in the plantar fascia posteriorly. No evidence of significant plantar fascial detachment from the calcaneus. Mild increased fluid content within the bursa posterior to the calcaneus and anterior to the distal Achilles tendon consistent with retrocalcaneal bursitis. The Achilles tendon appears intact. No evidence of an acute fracture, subluxation or dislocation. Joint spaces are preserved. Small/moderate right ankle joint effusion. Nonspecific subcutaneous edema about the right ankle.   - X-ray of cervical and lumbar spine from 2021 showed minimal cervical and lumbar degenerative changes.  - EKG in 2021 showed normal sinus rhythm.   - Overnight oximetry was indeterminate with evidence of possible sleep-related disordered breathing events. Of note, patient has been diagnosed with mild obstructive apnea before but has not been using CPAP.      INTERVAL HISTORY  He was  last seen in this clinic in February 2024. He has noticed slight worsening in right proximal arm strength. Lifting things up high are more difficult. He gets frequent aching pain in the right arm and shoulder if he uses it too much. Right foot and ankle pain/weakness have significantly improved after rehab and wearing camboot for several months.  He has not noticed much change in the left arm or left leg.  He denies any shortness of breath or loud snore at night. He did not tolerate CPAP in the past and is not ready to try it again. No trouble with swallowing.      He was on Wegovy and lost 40 lbs over 4-5 months. However, he developed GI side effects (stomach cramp, diarrhea) and has stopped the medication 3 weeks ago.     REVIEW OF SYSTEMS  All negative except for what indicated in the HPI. The following portions of the patient's history were reviewed and updated as appropriate: allergies, current medications, family history, medical history, surgical history, social history, and problem list.     PAST MEDICAL/SURGICAL HISTORY   Psoriasis of skin. Denies joint pain.     FAMILY HISTORY  Father passed away from carbon monoxide poisoning. Mother is still alive and asymptomatic.  Two half brothers, 1 brother,  and one sister are asymptomatic.   Five children (4 daughters, 1 son, age 4 to 15 years) are asymptomatic.     PHYSICAL EXAM    NEUROLOGICAL EXAMINATION  Mental status: normal.  Speech: normal.  Coordination: normal rapid alternating movements and finger to nose testing  Gait: antalgic gait as he cannot bear weight on the right heel  Posture: normal.  Romberg: negative.    The Neuropathy Impairment Scoring System (NIS) strength scale was utilized.    0=normal; -1=25% weak; -2=50% weak; -3=75% weak; -3.25=movement against gravity;   -3.5=movement, gravity eliminated;-3.75=minimal contraction; -4= paralysis.  Cranial nerves   R Muscle strength L  R  L   0 Frontalis 0   Visual acuity    0 Orbicularis oculi 0  3 mm  Pupils 3 mm   -1 Lower facial muscles -1  0 Light reflex 0   0 Temporal-[place] 0  0 Ptosis 0   0 Palate-Pharynx 0  0 Extraocular muscles 0   0 Sternocleidomastoid 0       0 Trapezius 0   Hearing    0 Genioglossus 0              R Muscle, strength L  R Muscle, strength L    Neck     Trunk    0 Neck flexors 0   Abdominal muscles    0 Neck extensors 0   Paraspinals     Upper Limbs    Lower Limbs    0 Supraspinatus 0  0 Iliopsoas 0   -1 Infraspinatus 0  0 Adductors, thigh 0   -2 Pectoralis -1  0 Gluteus medius 0   0 Deltoid 0  0 Gluteus max 0   0 Biceps brachii 0  0 Quadriceps 0   0 Brachioradialis 0  0 Hamstrings 0    Supinator/pronator   0 Tibialis anterior 0   -1 Triceps 0  0 Peronei 0   0 Wrist extensor 0  0 EHL 0   0 Wrist flexors 0  0 Toe extensors 0   0 Digit extensors 0  0 Tibialis post. 0   0 Digit flexors 0  0 Toe flexors 0   0 Thenar 0  0 Calf muscles 0   0 Hypothenar 0       0 Interossei 0       Muscle atrophy / enlargement: right shoulder girdle atrophy with reverse axillary fold       R Reflexes L   -1 Biceps brachii -1   0 Brachioradialis 0   -1 Triceps -1   no Urena no   0 Quadriceps 0   * Gastroc-soleus 0   no Clonus (ankle) no   flexion Plantar response flexion   *deferred due to right ankle pain     High arched palate: Absent. Elongated face: Absent. Scapular winging: mild lateral winging bilaterally (right worse than left) Pes cavus: Absent. Hammertoes: Absent. Contractures: Absent. Joint hypermobility: Absent.    Sensation  Face: normal.  Upper limbs: normal for all modalities.  Lower limbs: normal for all modalities.    ASSESSMENT / PLAN   #1 Fscioscapulohumeral muscular dystrophy (FSHD) type 1  #2 Right plantar fasciitis with right ankle effusion --> now improved     Mr. Miranda's exam today is overall stable from last year and continues to show asymmetric weakness in right>left shoulder girdle. Right leg weakness has improved as his orthopedic ankle and foot issues have resolved. We  discussed trying different type of NIV/CPAP at night but he is not ready for this. I recommend continuing active lifestyle, regular stretching and ROM exercise to help with shoulder pain.     - Follow-up in 1 year    I spent a total of 30 minutes on the day of the visit for chart review, face-to-face visit, counseling/coordination of care, and documentation. Please see the note for further information on patient assessment and treatment.       PATIENT EDUCATION  Ready to learn, no apparent learning barriers were identified; learning preferences include listening.  Explained diagnosis and treatment plan; patient expressed understanding of the content.      Again, thank you for allowing me to participate in the care of your patient.      Sincerely,    Chelsey Fam MD